# Patient Record
Sex: FEMALE | NOT HISPANIC OR LATINO | Employment: FULL TIME | URBAN - METROPOLITAN AREA
[De-identification: names, ages, dates, MRNs, and addresses within clinical notes are randomized per-mention and may not be internally consistent; named-entity substitution may affect disease eponyms.]

---

## 2024-04-30 ENCOUNTER — APPOINTMENT (OUTPATIENT)
Dept: PRIMARY CARE | Facility: CLINIC | Age: 34
End: 2024-04-30
Payer: COMMERCIAL

## 2024-05-28 PROBLEM — E78.6 LOW HDL (UNDER 40): Status: ACTIVE | Noted: 2020-01-31

## 2024-05-28 PROBLEM — E55.9 VITAMIN D DEFICIENCY: Status: ACTIVE | Noted: 2020-01-31

## 2024-05-28 PROBLEM — F41.9 ANXIETY AND DEPRESSION: Status: ACTIVE | Noted: 2024-04-18

## 2024-05-28 PROBLEM — F32.A ANXIETY AND DEPRESSION: Status: ACTIVE | Noted: 2024-04-18

## 2024-05-28 PROBLEM — R41.3 MEMORY LOSS: Status: ACTIVE | Noted: 2020-01-24

## 2024-05-28 PROBLEM — R73.01 IMPAIRED FASTING GLUCOSE: Status: ACTIVE | Noted: 2024-05-28

## 2024-05-28 RX ORDER — BUPROPION HYDROCHLORIDE 300 MG/1
1 TABLET ORAL EVERY MORNING
COMMUNITY
Start: 2023-07-27

## 2024-05-28 RX ORDER — DOCUSATE SODIUM 100 MG/1
100 CAPSULE, LIQUID FILLED ORAL 2 TIMES DAILY
COMMUNITY
Start: 2024-05-03 | End: 2024-06-03

## 2024-05-28 RX ORDER — FLUOXETINE HYDROCHLORIDE 20 MG/1
1 CAPSULE ORAL DAILY
COMMUNITY
Start: 2024-03-31

## 2024-05-28 RX ORDER — HYDROXYZINE HYDROCHLORIDE 10 MG/1
10 TABLET, FILM COATED ORAL DAILY PRN
COMMUNITY
Start: 2024-04-04

## 2024-05-29 ENCOUNTER — APPOINTMENT (OUTPATIENT)
Dept: PRIMARY CARE | Facility: CLINIC | Age: 34
End: 2024-05-29
Payer: COMMERCIAL

## 2024-06-06 ENCOUNTER — OFFICE VISIT (OUTPATIENT)
Dept: PRIMARY CARE | Facility: CLINIC | Age: 34
End: 2024-06-06
Payer: COMMERCIAL

## 2024-06-06 VITALS
HEART RATE: 76 BPM | OXYGEN SATURATION: 98 % | WEIGHT: 181 LBS | BODY MASS INDEX: 32.07 KG/M2 | DIASTOLIC BLOOD PRESSURE: 78 MMHG | SYSTOLIC BLOOD PRESSURE: 116 MMHG | HEIGHT: 63 IN

## 2024-06-06 DIAGNOSIS — E55.9 VITAMIN D DEFICIENCY: ICD-10-CM

## 2024-06-06 DIAGNOSIS — R73.01 IMPAIRED FASTING GLUCOSE: ICD-10-CM

## 2024-06-06 DIAGNOSIS — Z00.00 WELL ADULT EXAM: Primary | ICD-10-CM

## 2024-06-06 DIAGNOSIS — Z52.4 DONOR OF KIDNEY FOR TRANSPLANT: ICD-10-CM

## 2024-06-06 DIAGNOSIS — R53.83 OTHER FATIGUE: ICD-10-CM

## 2024-06-06 PROCEDURE — 1036F TOBACCO NON-USER: CPT | Performed by: NURSE PRACTITIONER

## 2024-06-06 PROCEDURE — 99395 PREV VISIT EST AGE 18-39: CPT | Performed by: NURSE PRACTITIONER

## 2024-06-06 RX ORDER — NALTREXONE HYDROCHLORIDE 50 MG/1
50 TABLET, FILM COATED ORAL DAILY
COMMUNITY
Start: 2024-05-10

## 2024-06-06 ASSESSMENT — PATIENT HEALTH QUESTIONNAIRE - PHQ9
SUM OF ALL RESPONSES TO PHQ9 QUESTIONS 1 AND 2: 0
1. LITTLE INTEREST OR PLEASURE IN DOING THINGS: NOT AT ALL
2. FEELING DOWN, DEPRESSED OR HOPELESS: NOT AT ALL

## 2024-06-06 ASSESSMENT — PAIN SCALES - GENERAL: PAINLEVEL: 0-NO PAIN
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
## 2024-06-19 ENCOUNTER — LAB (OUTPATIENT)
Dept: LAB | Facility: LAB | Age: 34
End: 2024-06-19
Payer: COMMERCIAL

## 2024-06-19 DIAGNOSIS — Z00.00 WELL ADULT EXAM: ICD-10-CM

## 2024-06-19 DIAGNOSIS — R53.83 OTHER FATIGUE: ICD-10-CM

## 2024-06-19 DIAGNOSIS — R73.01 IMPAIRED FASTING GLUCOSE: ICD-10-CM

## 2024-06-19 DIAGNOSIS — E55.9 VITAMIN D DEFICIENCY: ICD-10-CM

## 2024-06-19 LAB
25(OH)D3 SERPL-MCNC: 26 NG/ML (ref 31–100)
ALBUMIN SERPL-MCNC: 4.5 G/DL (ref 3.5–5)
ALP BLD-CCNC: 78 U/L (ref 35–125)
ALT SERPL-CCNC: 18 U/L (ref 5–40)
ANION GAP SERPL CALC-SCNC: 15 MMOL/L
APPEARANCE UR: CLEAR
AST SERPL-CCNC: 18 U/L (ref 5–40)
BASOPHILS # BLD AUTO: 0.02 X10*3/UL (ref 0–0.1)
BASOPHILS NFR BLD AUTO: 0.4 %
BILIRUB SERPL-MCNC: 0.4 MG/DL (ref 0.1–1.2)
BILIRUB UR STRIP.AUTO-MCNC: NEGATIVE MG/DL
BUN SERPL-MCNC: 15 MG/DL (ref 8–25)
CALCIUM SERPL-MCNC: 9.3 MG/DL (ref 8.5–10.4)
CHLORIDE SERPL-SCNC: 100 MMOL/L (ref 97–107)
CO2 SERPL-SCNC: 21 MMOL/L (ref 24–31)
COLOR UR: COLORLESS
CREAT SERPL-MCNC: 1.2 MG/DL (ref 0.4–1.6)
EGFRCR SERPLBLD CKD-EPI 2021: 61 ML/MIN/1.73M*2
EOSINOPHIL # BLD AUTO: 0.09 X10*3/UL (ref 0–0.7)
EOSINOPHIL NFR BLD AUTO: 1.7 %
ERYTHROCYTE [DISTWIDTH] IN BLOOD BY AUTOMATED COUNT: 11.9 % (ref 11.5–14.5)
EST. AVERAGE GLUCOSE BLD GHB EST-MCNC: 97 MG/DL
GLUCOSE SERPL-MCNC: 82 MG/DL (ref 65–99)
GLUCOSE UR STRIP.AUTO-MCNC: NORMAL MG/DL
HBA1C MFR BLD: 5 %
HCT VFR BLD AUTO: 43.1 % (ref 36–46)
HGB BLD-MCNC: 14.2 G/DL (ref 12–16)
IMM GRANULOCYTES # BLD AUTO: 0.03 X10*3/UL (ref 0–0.7)
IMM GRANULOCYTES NFR BLD AUTO: 0.6 % (ref 0–0.9)
KETONES UR STRIP.AUTO-MCNC: NEGATIVE MG/DL
LEUKOCYTE ESTERASE UR QL STRIP.AUTO: NEGATIVE
LYMPHOCYTES # BLD AUTO: 2.13 X10*3/UL (ref 1.2–4.8)
LYMPHOCYTES NFR BLD AUTO: 40.8 %
MCH RBC QN AUTO: 31.7 PG (ref 26–34)
MCHC RBC AUTO-ENTMCNC: 32.9 G/DL (ref 32–36)
MCV RBC AUTO: 96 FL (ref 80–100)
MONOCYTES # BLD AUTO: 0.34 X10*3/UL (ref 0.1–1)
MONOCYTES NFR BLD AUTO: 6.5 %
NEUTROPHILS # BLD AUTO: 2.61 X10*3/UL (ref 1.2–7.7)
NEUTROPHILS NFR BLD AUTO: 50 %
NITRITE UR QL STRIP.AUTO: NEGATIVE
NRBC BLD-RTO: 0 /100 WBCS (ref 0–0)
PH UR STRIP.AUTO: 6 [PH]
PLATELET # BLD AUTO: 321 X10*3/UL (ref 150–450)
POTASSIUM SERPL-SCNC: 4.7 MMOL/L (ref 3.4–5.1)
PROT SERPL-MCNC: 7.4 G/DL (ref 5.9–7.9)
PROT UR STRIP.AUTO-MCNC: NEGATIVE MG/DL
RBC # BLD AUTO: 4.48 X10*6/UL (ref 4–5.2)
RBC # UR STRIP.AUTO: NEGATIVE /UL
SODIUM SERPL-SCNC: 136 MMOL/L (ref 133–145)
SP GR UR STRIP.AUTO: 1.01
TSH SERPL DL<=0.05 MIU/L-ACNC: 1.77 MIU/L (ref 0.27–4.2)
UROBILINOGEN UR STRIP.AUTO-MCNC: NORMAL MG/DL
WBC # BLD AUTO: 5.2 X10*3/UL (ref 4.4–11.3)

## 2024-06-19 PROCEDURE — 85025 COMPLETE CBC W/AUTO DIFF WBC: CPT

## 2024-06-19 PROCEDURE — 80053 COMPREHEN METABOLIC PANEL: CPT

## 2024-06-19 PROCEDURE — 82306 VITAMIN D 25 HYDROXY: CPT

## 2024-06-19 PROCEDURE — 83036 HEMOGLOBIN GLYCOSYLATED A1C: CPT

## 2024-06-19 PROCEDURE — 84443 ASSAY THYROID STIM HORMONE: CPT

## 2024-06-19 PROCEDURE — 36415 COLL VENOUS BLD VENIPUNCTURE: CPT

## 2024-06-19 PROCEDURE — 81003 URINALYSIS AUTO W/O SCOPE: CPT

## 2024-08-21 ENCOUNTER — APPOINTMENT (OUTPATIENT)
Dept: PRIMARY CARE | Facility: CLINIC | Age: 34
End: 2024-08-21
Payer: COMMERCIAL

## 2024-08-21 VITALS
WEIGHT: 190 LBS | BODY MASS INDEX: 33.66 KG/M2 | DIASTOLIC BLOOD PRESSURE: 80 MMHG | OXYGEN SATURATION: 99 % | SYSTOLIC BLOOD PRESSURE: 110 MMHG | TEMPERATURE: 98.5 F | HEART RATE: 75 BPM

## 2024-08-21 DIAGNOSIS — E66.9 OBESITY (BMI 30-39.9): Primary | ICD-10-CM

## 2024-08-21 PROBLEM — F32.0 MAJOR DEPRESSIVE DISORDER, SINGLE EPISODE, MILD (CMS-HCC): Status: ACTIVE | Noted: 2020-10-22

## 2024-08-21 PROBLEM — E78.6 LIPOPROTEIN DEFICIENCY DISORDER: Status: ACTIVE | Noted: 2021-02-08

## 2024-08-21 PROBLEM — M51.26 HERNIATED LUMBAR INTERVERTEBRAL DISC: Status: ACTIVE | Noted: 2021-02-08

## 2024-08-21 PROBLEM — B37.2 CANDIDAL INTERTRIGO: Status: RESOLVED | Noted: 2024-08-21 | Resolved: 2024-08-21

## 2024-08-21 PROBLEM — B00.9 HERPES SIMPLEX VIRUS (HSV) INFECTION: Status: ACTIVE | Noted: 2020-10-22

## 2024-08-21 PROCEDURE — 99213 OFFICE O/P EST LOW 20 MIN: CPT | Performed by: NURSE PRACTITIONER

## 2024-08-21 RX ORDER — TIRZEPATIDE 2.5 MG/.5ML
2.5 INJECTION, SOLUTION SUBCUTANEOUS
Qty: 2 ML | Refills: 2 | Status: SHIPPED | OUTPATIENT
Start: 2024-08-25 | End: 2024-08-22 | Stop reason: WASHOUT

## 2024-08-21 ASSESSMENT — PATIENT HEALTH QUESTIONNAIRE - PHQ9
SUM OF ALL RESPONSES TO PHQ9 QUESTIONS 1 AND 2: 0
2. FEELING DOWN, DEPRESSED OR HOPELESS: NOT AT ALL
1. LITTLE INTEREST OR PLEASURE IN DOING THINGS: NOT AT ALL

## 2024-08-21 ASSESSMENT — PAIN SCALES - GENERAL: PAINLEVEL: 0-NO PAIN

## 2024-08-21 NOTE — PROGRESS NOTES
Subjective   Patient ID: María Elena Delgado is a 34 y.o. female who presents for Obesity.    HPI   Pt presents for weight concerns  She is interested in starting weight loss medications  Diet is varied - she has tried multiple diets without success. Presently tracking intake and trying to increase veggies/protein  Pt is active, playing [employer] ball 10-12 hours per week  Has tried Wellbutrin and Naltrexone thru online provider without success  Interested in GLP1 injectable    Review of Systems  Constitutional Symptoms: Negative for fever, loss of appetite, headaches, fatigue.   Cardiovascular: Negative for chest pain/pressure, palpitations, edema  Respiratory: Negative for shortness of breath, dyspnea on exertion, pain with breathing, coughing.   Gastrointestinal: Negative for nausea, vomiting, abdominal pain, change in bowel habits  Musculoskeletal: Negative for joint pain, joint swelling, myalgias, cramps.   Integumentary: Negative for skin trouble or rash.   Neurological: Negative for headache, numbness, tingling, weakness, tremors.   Psychiatric: Negative for depression, anxiety.   Endocrine: Negative for weight gain, heat or cold intolerance, polyuria, polydipsia, polyphagia.   Hematologic/Lymphatic: Negative for bruising, abnormal bleeding, swollen glands.     Objective   /80 (BP Location: Left arm, Patient Position: Sitting)   Pulse 75   Temp 36.9 °C (98.5 °F) (Temporal)   Wt 86.2 kg (190 lb)   SpO2 99%   BMI 33.66 kg/m²     Physical Exam  alert and oriented x3, NAD  Neck supple with no JVD  Lungs CTA bilaterally  Heart with RRR with no edema.  Abd obese, + normoactive bowel sounds, abd soft NT/ND  skin warm and dry  Neuro grossly intact     Assessment/Plan   Diagnoses and all orders for this visit:  Obesity (BMI 30-39.9)  Needs better control  Continue healthy diet - limit carbs, increase veggies, protein. Track using [employer]. 64oz water  Encouraged to check with her insurance company about GLP1  coverage.   Does not qualify for ozempic of mounjaro without DM diagnosis  [facility]  Follow up 1 month for weight check

## 2024-08-22 ENCOUNTER — TELEPHONE (OUTPATIENT)
Dept: PRIMARY CARE | Facility: CLINIC | Age: 34
End: 2024-08-22
Payer: COMMERCIAL

## 2024-08-22 DIAGNOSIS — E66.9 OBESITY (BMI 30-39.9): ICD-10-CM

## 2024-08-22 RX ORDER — TIRZEPATIDE 2.5 MG/.5ML
2.5 INJECTION, SOLUTION SUBCUTANEOUS
Qty: 2 ML | Refills: 2 | Status: CANCELLED | OUTPATIENT
Start: 2024-08-22

## 2024-08-22 NOTE — TELEPHONE ENCOUNTER
Pt needs the zepbound called in again , the pharmacy canc the order. [facility]

## 2024-08-27 NOTE — PROGRESS NOTES
Spoke with patient, states wayneound was sent as she requested, she needed no further assistance at this time.

## 2024-09-17 ENCOUNTER — APPOINTMENT (OUTPATIENT)
Dept: PRIMARY CARE | Facility: CLINIC | Age: 34
End: 2024-09-17
Payer: COMMERCIAL

## 2024-09-20 ENCOUNTER — APPOINTMENT (OUTPATIENT)
Dept: PRIMARY CARE | Facility: CLINIC | Age: 34
End: 2024-09-20
Payer: COMMERCIAL

## 2024-09-20 ENCOUNTER — CLINICAL SUPPORT (OUTPATIENT)
Dept: PRIMARY CARE | Facility: CLINIC | Age: 34
End: 2024-09-20
Payer: COMMERCIAL

## 2024-09-20 VITALS — WEIGHT: 179.2 LBS | BODY MASS INDEX: 31.74 KG/M2

## 2024-09-20 DIAGNOSIS — E66.9 OBESITY (BMI 30-39.9): ICD-10-CM

## 2024-09-20 DIAGNOSIS — E66.9 OBESITY WITH BODY MASS INDEX 30 OR GREATER: Primary | ICD-10-CM

## 2024-09-20 RX ORDER — TIRZEPATIDE 5 MG/.5ML
5 INJECTION, SOLUTION SUBCUTANEOUS
Qty: 2 ML | Refills: 0 | Status: SHIPPED | OUTPATIENT
Start: 2024-09-20 | End: 2024-10-18

## 2024-09-20 NOTE — PATIENT INSTRUCTIONS
Purchase either Miralax or Docusate Sodium 100mg capsules and take daily.    START Zepbound 5mg once weekly   Follow up with Angie one month

## 2024-09-20 NOTE — PROGRESS NOTES
Pharmacist Clinic: Weight Management    María Elena Delgado was referred to the Clinical Pharmacy Team for weight management.     Referring Provider: Elayne Acevedo, ELIAS-CNP  Problem List Items Addressed This Visit       Obesity with body mass index 30 or greater - Primary       HISTORY OF PRESENT ILLNESS    Starting Weight 190  Current Weight 179.2    Diet: diet changed diet, eating less sweets, stopped drinking SS soda, smaller portion size      Exercise Routine: plays pickleball 10-12 hours weekly     Additional Contributory Factors:        PHARMACY ASSESSMENT    Allergies: Patient has no known allergies.     [facility]pharm[facility] #[id number] - [place], [place] - [address] AT [facility] [address]  [address]  [facility] [address]  Phone: [phone] Fax: [phone]      No issues reported in regards to accessibility, adherence, adverse effects, or organization  Patient states she is paying out of pocket for Zepbound at $550 per month.   States that she may look into pricing at [employer] as medication is helping with sugar/sweet cravings.    CURRENT PHARMACOTHERAPY  - Zepbound 2.5mg      DRUG INTERACTIONS  - No significant drug-drug interactions exist that require adjustment to therapy    LAB  Lab Results   Component Value Date    BILITOT 0.4 06/19/2024    CALCIUM 9.3 06/19/2024    CO2 21 (L) 06/19/2024     06/19/2024    CREATININE 1.20 06/19/2024    GLUCOSE 82 06/19/2024    ALKPHOS 78 06/19/2024    K 4.7 06/19/2024    PROT 7.4 06/19/2024     06/19/2024    AST 18 06/19/2024    ALT 18 06/19/2024    BUN 15 06/19/2024    ANIONGAP 15 06/19/2024    ALBUMIN 4.5 06/19/2024    EGFR 61 06/19/2024     Lab Results   Component Value Date    TRIG 57 03/30/2023    CHOL 152 03/30/2023    LDLCALC 109 03/30/2023    HDL 32 (L) 03/30/2023     Lab Results   Component Value Date    HGBA1C 5.0 06/19/2024       Wt Readings from Last 6 Encounters:   08/21/24 86.2 kg (190 lb)   06/06/24 82.1 kg (181 lb)   07/27/23 85.3 kg (188  lb)   04/10/23 91.2 kg (201 lb)   03/31/23 107 kg (235 lb)   01/11/23 107 kg (235 lb)       Current Outpatient Medications on File Prior to Visit   Medication Sig Dispense Refill    buPROPion XL (Wellbutrin XL) 300 mg 24 hr tablet Take 1 tablet (300 mg) by mouth once daily in the morning.      FLUoxetine (PROzac) 20 mg capsule Take 1 capsule (20 mg) by mouth once daily.      hydrOXYzine HCL (Atarax) 10 mg tablet Take 1 tablet (10 mg) by mouth once daily as needed for anxiety.      naltrexone (Depade) 50 mg tablet Take 1 tablet (50 mg) by mouth once daily.      tirzepatide, weight loss, (Zepbound) 2.5 mg/0.5 mL injection Inject 2.5 mg under the skin every 7 days. 4 each 1     No current facility-administered medications on file prior to visit.       PATIENT EDUCATION/GOALS  - Counseled patient on MOA, expectations, side effects, duration of therapy, contraindications, administration, and monitoring parameters  - Answered all patient questions and concerns; provided [facility] phone number if issues/questions arise    RECOMMENDATIONS/PLAN  1. Increase Zepbound dose to 5mg weekly   2.  Discussed monthly visits for 3 months then quarterly to assess SE/weight loss    Clinical Pharmacist follow up: 1 month  Type of Encounter: in person     Continue all meds under the continuation of care with the referring provider and clinical pharmacy team.    Thank you,  Angie Denise, [facility]     Verbal consent to manage patient's drug therapy was obtained from patient. They were informed they may decline to participate or withdraw from participation in pharmacy services at any time.

## 2024-10-23 ENCOUNTER — APPOINTMENT (OUTPATIENT)
Dept: PRIMARY CARE | Facility: CLINIC | Age: 34
End: 2024-10-23
Payer: COMMERCIAL

## 2024-10-23 VITALS — BODY MASS INDEX: 29.97 KG/M2 | WEIGHT: 169.2 LBS

## 2024-10-23 DIAGNOSIS — E66.9 OBESITY WITH BODY MASS INDEX 30 OR GREATER: Primary | ICD-10-CM

## 2024-10-23 NOTE — PROGRESS NOTES
Pharmacist Clinic: Weight Management    María Elena Delgado was referred to the Clinical Pharmacy Team for weight management.     Referring Provider: ELIAS Cummins-CNP  Problem List Items Addressed This Visit       Obesity with body mass index 30 or greater - Primary       HISTORY OF PRESENT ILLNESS    Starting Weight 190  Current Weight 169.2     Diet: diet changed diet, eating less sweets, stopped drinking SS soda, smaller portion size     Exercise Routine: plays pickleball 10-12 hours weekly      Additional Contributory Factors:  none       PHARMACY ASSESSMENT    Allergies: Patient has no known allergies.     [id number] - [place], [place] - [address] AT [place] [address]  [address]  [facility] [address]  Phone: [phone] Fax: [phone]    [id number] - [place], [place] - [address] [facility] [address]  [facility] [facility] [address]  Phone: [phone] Fax: [phone]      No issues reported in regards to accessibility, affordability, adherence, adverse effects, or organization    CURRENT PHARMACOTHERAPY  - Zepbound 5mg weekly      DRUG INTERACTIONS  - No significant drug-drug interactions exist that require adjustment to therapy    LAB  Lab Results   Component Value Date    BILITOT 0.4 06/19/2024    CALCIUM 9.3 06/19/2024    CO2 21 (L) 06/19/2024     06/19/2024    CREATININE 1.20 06/19/2024    GLUCOSE 82 06/19/2024    ALKPHOS 78 06/19/2024    K 4.7 06/19/2024    PROT 7.4 06/19/2024     06/19/2024    AST 18 06/19/2024    ALT 18 06/19/2024    BUN 15 06/19/2024    ANIONGAP 15 06/19/2024    ALBUMIN 4.5 06/19/2024    EGFR 61 06/19/2024     Lab Results   Component Value Date    TRIG 57 03/30/2023    CHOL 152 03/30/2023    LDLCALC 109 03/30/2023    HDL 32 (L) 03/30/2023     Lab Results   Component Value Date    HGBA1C 5.0 06/19/2024       Wt Readings from Last 6 Encounters:   09/20/24 81.3 kg (179 lb 3.2 oz)   08/21/24 86.2 kg (190 lb)    24 82.1 kg (181 lb)   23 85.3 kg (188 lb)   04/10/23 91.2 kg (201 lb)   23 107 kg (235 lb)       Current Outpatient Medications on File Prior to Visit   Medication Sig Dispense Refill    buPROPion XL (Wellbutrin XL) 300 mg 24 hr tablet Take 1 tablet (300 mg) by mouth once daily in the morning.      FLUoxetine (PROzac) 20 mg capsule Take 1 capsule (20 mg) by mouth once daily.      hydrOXYzine HCL (Atarax) 10 mg tablet Take 1 tablet (10 mg) by mouth once daily as needed for anxiety.      naltrexone (Depade) 50 mg tablet Take 1 tablet (50 mg) by mouth once daily.      [] tirzepatide, weight loss, (Zepbound) 5 mg/0.5 mL injection Inject 5 mg under the skin every 7 days for 28 days. 2 mL 0     No current facility-administered medications on file prior to visit.       PATIENT EDUCATION/GOALS  - Counseled patient on MOA, expectations, side effects, duration of therapy, contraindications, administration, and monitoring parameters  - Answered all patient questions and concerns; provided [facility] phone number if issues/questions arise    RECOMMENDATIONS/PLAN  1. Suggest Continue Zepbound 5mg once weekly   2.  Discussed medication via other sources due to cost.  Pt obtained med from [facility]     Clinical Pharmacist follow up: 2 months  Type of Encounter: in person     Continue all meds under the continuation of care with the referring provider and clinical pharmacy team.    Thank you,  Angie Denise, [facility]     Verbal consent to manage patient's drug therapy was obtained from patient. They were informed they may decline to participate or withdraw from participation in pharmacy services at any time.

## 2024-12-31 ENCOUNTER — APPOINTMENT (OUTPATIENT)
Dept: PRIMARY CARE | Facility: CLINIC | Age: 34
End: 2024-12-31
Payer: COMMERCIAL

## 2024-12-31 VITALS — WEIGHT: 153 LBS | BODY MASS INDEX: 27.1 KG/M2

## 2024-12-31 DIAGNOSIS — E66.9 OBESITY WITH BODY MASS INDEX 30 OR GREATER: ICD-10-CM

## 2024-12-31 NOTE — PROGRESS NOTES
Pharmacist Clinic: Weight Management  María Elena Delgado was referred to the Clinical Pharmacy Team for weight management.   Referring Provider: Elayne Acevedo, APRN-CNP    Problem List Items Addressed This Visit       Obesity with body mass index 30 or greater       History of Present Illness  Starting Weight/ lbs  Current Weight 153 lbs  Wt Readings from Last 1 Encounters:   10/23/24 76.7 kg (169 lb 3.2 oz)       Diet: improving, trying to eat smaller portions    Exercise Routine: 30 minutes 3-4 days per week    Additional Contributory Factors: She has a past medical history of Personal history of other diseases of the respiratory system (12/14/2014).    CURRENT PHARMACOTHERAPY  Compounded tirzepatide 7.5mg     Patient states that her insurance allows for PA after 1/1/25.  Will submit PA at that time which will save money on compounded product.      No issues reported in regards to accessibility, affordability, adherence, adverse effects, or organization.      DRUG INTERACTIONS  - No significant drug-drug interactions exist that require adjustment to therapy    LAB  Lab Results   Component Value Date    BILITOT 0.4 06/19/2024    CALCIUM 9.3 06/19/2024    CO2 21 (L) 06/19/2024     06/19/2024    CREATININE 1.20 06/19/2024    GLUCOSE 82 06/19/2024    ALKPHOS 78 06/19/2024    K 4.7 06/19/2024    PROT 7.4 06/19/2024     06/19/2024    AST 18 06/19/2024    ALT 18 06/19/2024    BUN 15 06/19/2024    ANIONGAP 15 06/19/2024    ALBUMIN 4.5 06/19/2024    EGFR 61 06/19/2024     Lab Results   Component Value Date    TRIG 57 03/30/2023    CHOL 152 03/30/2023    LDLCALC 109 03/30/2023    HDL 32 (L) 03/30/2023     Lab Results   Component Value Date    HGBA1C 5.0 06/19/2024       Current Outpatient Medications on File Prior to Visit   Medication Sig Dispense Refill    buPROPion XL (Wellbutrin XL) 300 mg 24 hr tablet Take 1 tablet (300 mg) by mouth once daily in the morning.      FLUoxetine (PROzac) 20 mg capsule  Take 1 capsule (20 mg) by mouth once daily.      hydrOXYzine HCL (Atarax) 10 mg tablet Take 1 tablet (10 mg) by mouth once daily as needed for anxiety.      naltrexone (Depade) 50 mg tablet Take 1 tablet (50 mg) by mouth once daily.       No current facility-administered medications on file prior to visit.       PATIENT EDUCATION/GOALS  - Counseled patient on MOA, expectations, side effects, duration of therapy, contraindications, administration, and monitoring parameters of current medication   - Answered all patient questions and concerns; provided [facility] phone number if issues/questions arise    RECOMMENDATIONS/PLAN  1. Suggest increase Zepbound to 7.5mg weekly   2.  Discussed monthly visits for 3 months then quarterly to assess SE/weight loss  3.  Clinical Pharmacist follow up: 3 months    Continue all meds under the continuation of care with the referring provider and clinical pharmacy team.  Send 10mg rx for 3 months if PA approved     Thank youALEKS, [facility], [facility]      Verbal consent to manage patient's drug therapy was obtained from patient. They were informed they may decline to participate or withdraw from participation in pharmacy services at any time.

## 2025-01-02 DIAGNOSIS — E66.9 OBESITY WITH BODY MASS INDEX 30 OR GREATER: Primary | ICD-10-CM

## 2025-01-14 ENCOUNTER — OFFICE VISIT (OUTPATIENT)
Dept: URGENT CARE | Age: 35
End: 2025-01-14
Payer: COMMERCIAL

## 2025-01-14 VITALS
HEART RATE: 95 BPM | SYSTOLIC BLOOD PRESSURE: 108 MMHG | TEMPERATURE: 98.8 F | DIASTOLIC BLOOD PRESSURE: 71 MMHG | OXYGEN SATURATION: 99 % | RESPIRATION RATE: 18 BRPM

## 2025-01-14 DIAGNOSIS — N30.01 ACUTE CYSTITIS WITH HEMATURIA: Primary | ICD-10-CM

## 2025-01-14 DIAGNOSIS — R30.0 DYSURIA: ICD-10-CM

## 2025-01-14 LAB
POC APPEARANCE, URINE: ABNORMAL
POC BILIRUBIN, URINE: ABNORMAL
POC BLOOD, URINE: ABNORMAL
POC COLOR, URINE: YELLOW
POC GLUCOSE, URINE: NEGATIVE MG/DL
POC KETONES, URINE: ABNORMAL MG/DL
POC LEUKOCYTES, URINE: ABNORMAL
POC NITRITE,URINE: NEGATIVE
POC PH, URINE: 6 PH
POC PROTEIN, URINE: ABNORMAL MG/DL
POC SPECIFIC GRAVITY, URINE: 1.02
POC UROBILINOGEN, URINE: 0.2 EU/DL
PREGNANCY TEST URINE, POC: NEGATIVE

## 2025-01-14 PROCEDURE — 87186 SC STD MICRODIL/AGAR DIL: CPT

## 2025-01-14 PROCEDURE — 81003 URINALYSIS AUTO W/O SCOPE: CPT | Performed by: NURSE PRACTITIONER

## 2025-01-14 PROCEDURE — 99204 OFFICE O/P NEW MOD 45 MIN: CPT | Performed by: NURSE PRACTITIONER

## 2025-01-14 PROCEDURE — 81025 URINE PREGNANCY TEST: CPT | Performed by: NURSE PRACTITIONER

## 2025-01-14 PROCEDURE — 87086 URINE CULTURE/COLONY COUNT: CPT

## 2025-01-14 RX ORDER — SULFAMETHOXAZOLE AND TRIMETHOPRIM 800; 160 MG/1; MG/1
1 TABLET ORAL 2 TIMES DAILY
Qty: 14 TABLET | Refills: 0 | Status: SHIPPED | OUTPATIENT
Start: 2025-01-14

## 2025-01-14 RX ORDER — PHENAZOPYRIDINE HYDROCHLORIDE 200 MG/1
200 TABLET, FILM COATED ORAL 3 TIMES DAILY PRN
Qty: 10 TABLET | Refills: 0 | Status: SHIPPED | OUTPATIENT
Start: 2025-01-14 | End: 2025-01-17

## 2025-01-14 ASSESSMENT — ENCOUNTER SYMPTOMS
RHINORRHEA: 0
NECK PAIN: 0
FATIGUE: 0
FLANK PAIN: 0
HEMATURIA: 0
EYE PAIN: 0
CHILLS: 0
TROUBLE SWALLOWING: 0
DIZZINESS: 0
FREQUENCY: 1
COUGH: 0
BACK PAIN: 0
DIARRHEA: 0
PALPITATIONS: 0
DYSURIA: 1
VOMITING: 0
DIFFICULTY URINATING: 0
SORE THROAT: 0
MYALGIAS: 0
NAUSEA: 0
HEADACHES: 0
APPETITE CHANGE: 0
WOUND: 0
SINUS PAIN: 0
SINUS PRESSURE: 0
ABDOMINAL PAIN: 0
FEVER: 0
ARTHRALGIAS: 0
CONSTIPATION: 0
CHEST TIGHTNESS: 0
SHORTNESS OF BREATH: 0

## 2025-01-14 ASSESSMENT — VISUAL ACUITY: OU: 1

## 2025-01-14 NOTE — LETTER
January 14, 2025     Patient: María Elena Delgado   YOB: 1990   Date of Visit: 1/14/2025       To Whom It May Concern:    María Elena Delgado was seen in my clinic on 1/14/2025 at 8:00 pm. Please excuse María Elena for her absence from work on this day to make the appointment.    If you have any questions or concerns, please don't hesitate to call.         Sincerely,         Lolly Pierre, APRN-CNP        CC: No Recipients

## 2025-01-15 NOTE — PROGRESS NOTES
Subjective   Patient ID: María Elena Delgado is a 34 y.o. female. They present today with a chief complaint of UTI (Uncomfortable to urinate, x 2 days. ).    History of Present Illness  Patient is a 35yo female who present with UTI symptoms - urgency, frequency and pain x 2 days. The patient has one kidney. She donated one of her kidneys 8 months ago. This is her first UTI since the donation. Her last UTI was when she was pregnant. She is under the care of a nephrologist for the next two years and does not require any renal dosing of medications.       UTI  Associated symptoms: no abdominal pain, no chest pain, no congestion, no cough, no diarrhea, no ear pain, no fatigue, no fever, no headaches, no myalgias, no nausea, no rash, no rhinorrhea, no shortness of breath, no sore throat and no vomiting        Past Medical History  Allergies as of 01/14/2025    (No Known Allergies)       (Not in a hospital admission)       Past Medical History:   Diagnosis Date    Personal history of other diseases of the respiratory system 12/14/2014    History of sore throat       Past Surgical History:   Procedure Laterality Date    OTHER SURGICAL HISTORY  03/21/2019    Foot surgery        reports that she has never smoked. She has never used smokeless tobacco. She reports current alcohol use. She reports that she does not use drugs.    Review of Systems  Review of Systems   Constitutional:  Negative for appetite change, chills, fatigue and fever.   HENT:  Negative for congestion, dental problem, ear pain, hearing loss, postnasal drip, rhinorrhea, sinus pressure, sinus pain, sneezing, sore throat and trouble swallowing.    Eyes:  Negative for pain.   Respiratory:  Negative for cough, chest tightness and shortness of breath.    Cardiovascular:  Negative for chest pain and palpitations.   Gastrointestinal:  Negative for abdominal pain, constipation, diarrhea, nausea and vomiting.   Genitourinary:  Positive for dysuria, frequency and  urgency. Negative for decreased urine volume, difficulty urinating, enuresis, flank pain, genital sores, hematuria, menstrual problem, pelvic pain, vaginal bleeding, vaginal discharge and vaginal pain.   Musculoskeletal:  Negative for arthralgias, back pain, gait problem, myalgias and neck pain.   Skin:  Negative for rash and wound.   Neurological:  Negative for dizziness and headaches.   Psychiatric/Behavioral:  Negative for self-injury and suicidal ideas.                                   Objective    Vitals:    01/14/25 1958   BP: 108/71   BP Location: Left arm   Pulse: 95   Resp: 18   Temp: 37.1 °C (98.8 °F)   TempSrc: Oral   SpO2: 99%     No LMP recorded.    Physical Exam  Vitals reviewed.   Constitutional:       General: She is awake. She is not in acute distress.     Appearance: Normal appearance. She is well-developed, well-groomed and normal weight. She is not ill-appearing.   HENT:      Head: Normocephalic and atraumatic.      Right Ear: Hearing and external ear normal.      Left Ear: Hearing and external ear normal.      Nose: Nose normal. No nasal deformity or signs of injury.      Mouth/Throat:      Lips: Pink.   Eyes:      General: Lids are normal. Vision grossly intact.   Cardiovascular:      Rate and Rhythm: Normal rate and regular rhythm.      Heart sounds: Normal heart sounds, S1 normal and S2 normal. Heart sounds not distant. No murmur heard.  Pulmonary:      Effort: Pulmonary effort is normal. No tachypnea, bradypnea, accessory muscle usage, prolonged expiration, respiratory distress or retractions.      Breath sounds: Normal breath sounds and air entry. No stridor, decreased air movement or transmitted upper airway sounds. No decreased breath sounds.   Chest:      Chest wall: No deformity.   Abdominal:      General: Abdomen is flat. Bowel sounds are normal.      Palpations: Abdomen is soft.      Tenderness: There is no right CVA tenderness or left CVA tenderness.   Skin:     General: Skin is  warm and dry.      Capillary Refill: Capillary refill takes less than 2 seconds.   Neurological:      General: No focal deficit present.      Mental Status: She is alert.      Gait: Gait is intact.   Psychiatric:         Attention and Perception: Attention and perception normal.         Mood and Affect: Mood and affect normal.         Speech: Speech normal.         Behavior: Behavior normal. Behavior is cooperative.         Procedures    Point of Care Test & Imaging Results from this visit  Results for orders placed or performed in visit on 01/14/25   POCT pregnancy, urine manually resulted   Result Value Ref Range    Preg Test, Ur Negative Negative   POCT UA Automated manually resulted   Result Value Ref Range    POC Color, Urine Yellow Straw, Yellow, Light-Yellow    POC Appearance, Urine Cloudy (A) Clear    POC Glucose, Urine NEGATIVE NEGATIVE mg/dl    POC Bilirubin, Urine SMALL (1+) (A) NEGATIVE    POC Ketones, Urine 15 (1+) (A) NEGATIVE mg/dl    POC Specific Gravity, Urine 1.025 1.005 - 1.035    POC Blood, Urine LARGE (3+) (A) NEGATIVE    POC PH, Urine 6.0 No Reference Range Established PH    POC Protein, Urine 100 (2+) (A) NEGATIVE mg/dl    POC Urobilinogen, Urine 0.2 0.2, 1.0 EU/DL    Poc Nitrite, Urine NEGATIVE NEGATIVE    POC Leukocytes, Urine LARGE (3+) (A) NEGATIVE      No results found.    Diagnostic study results (if any) were reviewed by JOSELO Schwartz.    Assessment/Plan   Allergies, medications, history, and pertinent labs/EKGs/Imaging reviewed by JOSELO Schwartz.     Medical Decision Making  Urinalysis indicative of a urinary tract infection. Will treat with abx and pyridium for symptoms.  Will also send urine for culture. Advised patient to increase fluid intake and to notify her nephrologist of UTI and urinalysis results that include protein, ketones, and bilirubin.     Risks, benefits, and alternatives of the medications and treatment plan prescribed today were  discussed, and patient expressed understanding. Plan follow up as discussed or as needed if any worsening symptoms or change in condition. Reinforced red flags including (but not limited to): severe or worsening abdominal pain; difficulty swallowing; stiff neck; shortness of breath; coughing or vomiting blood; chest pain; and new or increased fever are indications to go to the Emergency Department.    The patient voices understanding of all medications. No barriers to adherence. Patient is taking all medications as prescribed and tolerating well. For any new medications, the patient was instructed of directions for and consequences of not taking medication and they were informed about the potential side effects and drug interactions. The after-visit summary was given to the patient and care instructions were reviewed with the patient. All questions were answered and the patient verbalized understanding of the plan of care for today.    Orders and Diagnoses  Diagnoses and all orders for this visit:  Acute cystitis with hematuria  -     sulfamethoxazole-trimethoprim (Bactrim DS) 800-160 mg tablet; Take 1 tablet by mouth 2 times a day.  Dysuria  -     POCT pregnancy, urine manually resulted  -     POCT UA Automated manually resulted  -     Urine Culture  -     phenazopyridine (Pyridium) 200 mg tablet; Take 1 tablet (200 mg) by mouth 3 times a day as needed for bladder spasms for up to 3 days.      Medical Admin Record      Patient disposition: Home    Electronically signed by JOSELO Schwartz  8:28 PM

## 2025-01-17 LAB — BACTERIA UR CULT: ABNORMAL

## 2025-01-22 ENCOUNTER — TELEPHONE (OUTPATIENT)
Dept: URGENT CARE | Age: 35
End: 2025-01-22

## 2025-01-22 NOTE — TELEPHONE ENCOUNTER
Called pt at telephone number provided. Unable to leave VM. Called pt to discuss ABT and urine culture.

## 2025-01-27 DIAGNOSIS — E66.9 OBESITY WITH BODY MASS INDEX 30 OR GREATER: Primary | ICD-10-CM

## 2025-01-27 RX ORDER — TIRZEPATIDE 10 MG/.5ML
10 INJECTION, SOLUTION SUBCUTANEOUS
Qty: 2 ML | Refills: 0 | Status: SHIPPED | OUTPATIENT
Start: 2025-01-27

## 2025-01-27 NOTE — PROGRESS NOTES
Pt insurance requires maintenance dose of 5,10,15.  Sent one month supply of 10mg for patient.  Must make appt with new PCP for additional fills.

## 2025-02-13 ENCOUNTER — APPOINTMENT (OUTPATIENT)
Dept: PRIMARY CARE | Facility: CLINIC | Age: 35
End: 2025-02-13
Payer: COMMERCIAL

## 2025-02-13 VITALS
TEMPERATURE: 97.4 F | DIASTOLIC BLOOD PRESSURE: 70 MMHG | WEIGHT: 147 LBS | HEIGHT: 63 IN | HEART RATE: 80 BPM | OXYGEN SATURATION: 99 % | SYSTOLIC BLOOD PRESSURE: 112 MMHG | BODY MASS INDEX: 26.05 KG/M2

## 2025-02-13 DIAGNOSIS — E66.9 OBESITY WITH BODY MASS INDEX 30 OR GREATER: Primary | ICD-10-CM

## 2025-02-13 DIAGNOSIS — R39.9 UTI SYMPTOMS: ICD-10-CM

## 2025-02-13 DIAGNOSIS — N30.90 CYSTITIS: ICD-10-CM

## 2025-02-13 LAB
POC APPEARANCE, URINE: CLEAR
POC BILIRUBIN, URINE: NEGATIVE
POC BLOOD, URINE: NEGATIVE
POC COLOR, URINE: ABNORMAL
POC GLUCOSE, URINE: NEGATIVE MG/DL
POC KETONES, URINE: NEGATIVE MG/DL
POC LEUKOCYTES, URINE: ABNORMAL
POC NITRITE,URINE: NEGATIVE
POC PH, URINE: 5.5 PH
POC PROTEIN, URINE: NEGATIVE MG/DL
POC SPECIFIC GRAVITY, URINE: 1.01
POC UROBILINOGEN, URINE: 0.2 EU/DL

## 2025-02-13 PROCEDURE — 81002 URINALYSIS NONAUTO W/O SCOPE: CPT

## 2025-02-13 PROCEDURE — 99213 OFFICE O/P EST LOW 20 MIN: CPT

## 2025-02-13 PROCEDURE — 3008F BODY MASS INDEX DOCD: CPT

## 2025-02-13 PROCEDURE — 1036F TOBACCO NON-USER: CPT

## 2025-02-13 RX ORDER — TIRZEPATIDE 10 MG/.5ML
10 INJECTION, SOLUTION SUBCUTANEOUS
Qty: 2 ML | Refills: 2 | Status: SHIPPED | OUTPATIENT
Start: 2025-02-13

## 2025-02-13 RX ORDER — SULFAMETHOXAZOLE AND TRIMETHOPRIM 800; 160 MG/1; MG/1
1 TABLET ORAL 2 TIMES DAILY
Qty: 10 TABLET | Refills: 0 | Status: SHIPPED | OUTPATIENT
Start: 2025-02-13 | End: 2025-02-18

## 2025-02-13 ASSESSMENT — PATIENT HEALTH QUESTIONNAIRE - PHQ9
1. LITTLE INTEREST OR PLEASURE IN DOING THINGS: NOT AT ALL
2. FEELING DOWN, DEPRESSED OR HOPELESS: NOT AT ALL
SUM OF ALL RESPONSES TO PHQ9 QUESTIONS 1 AND 2: 0

## 2025-02-13 ASSESSMENT — COLUMBIA-SUICIDE SEVERITY RATING SCALE - C-SSRS
1. IN THE PAST MONTH, HAVE YOU WISHED YOU WERE DEAD OR WISHED YOU COULD GO TO SLEEP AND NOT WAKE UP?: NO
2. HAVE YOU ACTUALLY HAD ANY THOUGHTS OF KILLING YOURSELF?: NO
6. HAVE YOU EVER DONE ANYTHING, STARTED TO DO ANYTHING, OR PREPARED TO DO ANYTHING TO END YOUR LIFE?: NO

## 2025-02-13 NOTE — ASSESSMENT & PLAN NOTE
Chronic condition  Weight loss as expected  Medication: About 10 mg subcutaneously weekly  Explained intended effects, potential side effects, and schedule of dosages of the medication.  Discussed healthy diet and exercise at length to maintain weight loss  Follow up in 3 month    Orders:    tirzepatide, weight loss, (Zepbound) 10 mg/0.5 mL injection; Inject 10 mg under the skin every 7 days.

## 2025-02-13 NOTE — PROGRESS NOTES
"Subjective     Patient ID: María Elena Delgado is a 34 y.o. female who presents for Medication Visit (Requested a UA , due to frequency ).      HPI  Jo presents today for follow-up of weight management.  She has been taking Zepbound, current dose is 10 mg subcutaneous weekly.Denies any side effects on increased dose.  Starting weight of 190.  Current weight: 147    She had lost about 60 lbs on her own prior to beginning medication. Highest weight was 250.     Diet interventions: She have been working on her portion control, mindful of healthier eating  Exercise interventions: Plays Babyage ball 3-4 days per week, weight training      She also has concerns today for persistent feeling of bladder fullness.  She had a urinary tract infection last month and was concerned that maybe it had not completely cleared.  She did complete a course of Bactrim DS.  Denies any pain with urination, any urinary frequency, or urgency.  No hematuria.  No flank pain.  No fevers no chills.    Patient's recent visit notes, medication and allergy lists, past medical surgical social hx, immunization, vitals, problem list, recent tests were reviewed by me for pertinence to this visit.        Review of Systems  All other systems have been reviewed and are negative except as noted in the HPI.         Objective   /70 (BP Location: Left arm, Patient Position: Sitting, BP Cuff Size: Adult)   Pulse 80   Temp 36.3 °C (97.4 °F) (Oral)   Ht 1.6 m (5' 3\")   Wt 66.7 kg (147 lb)   SpO2 99%   BMI 26.04 kg/m²       Physical Exam  Nursing notes and vitals reviewed  General appearance - AAOx3, non distressed  CVS - Warm and well perfused  RESP - No respiratory distress  PSYCH - Normal thought content, fluent and appropriate speech        Assessment & Plan  Obesity with body mass index 30 or greater  Chronic condition  Weight loss as expected  Medication: About 10 mg subcutaneously weekly  Explained intended effects, potential side effects, and " schedule of dosages of the medication.  Discussed healthy diet and exercise at length to maintain weight loss  Follow up in 3 month    Orders:    tirzepatide, weight loss, (Zepbound) 10 mg/0.5 mL injection; Inject 10 mg under the skin every 7 days.    Cystitis  Acute.  Urine dipstick: +leukocytes  Start Bactrim DS- please complete full course unless you hear otherwise from our office.  Discussed indication for antibiotic use, administration instructions, and adverse effects with patient.  Increase your fluid intake, Tylenol or Motrin as needed for pain or fever.  May use OTC AZO/UROSTAT/Cystex for symptoms relief if desired.   Discussed hygiene for prevention.  Call our office to report and new fever/chills or back pain.  Follow up in 1 week if symptoms persist.     Orders:    sulfamethoxazole-trimethoprim (Bactrim DS) 800-160 mg tablet; Take 1 tablet by mouth 2 times a day for 5 days.    UTI symptoms    Orders:    POCT UA (nonautomated) manually resulted              Jeanne Zafar, APRN-CNP

## 2025-03-31 ENCOUNTER — APPOINTMENT (OUTPATIENT)
Dept: PRIMARY CARE | Facility: CLINIC | Age: 35
End: 2025-03-31
Payer: COMMERCIAL

## 2025-03-31 VITALS — HEIGHT: 63 IN | BODY MASS INDEX: 25.87 KG/M2 | WEIGHT: 146 LBS

## 2025-03-31 DIAGNOSIS — E66.9 OBESITY WITH BODY MASS INDEX 30 OR GREATER: ICD-10-CM

## 2025-03-31 RX ORDER — TIRZEPATIDE 12.5 MG/.5ML
12.5 INJECTION, SOLUTION SUBCUTANEOUS
Qty: 2 ML | Refills: 0 | Status: SHIPPED | OUTPATIENT
Start: 2025-03-31

## 2025-03-31 NOTE — PROGRESS NOTES
Pharmacist Clinic: Weight Management  María Elena Delgado was referred to the Clinical Pharmacy Team for weight management.   Referring Provider: JOSELO Nuñez    Pt here today for follow up re: wt loss.  Patient has been using weekly GLP-1/GIP medication for the last 8 months.  Pt is following a healthy diet and plays pickleball 3-4 times per week.  She continues to work towards a healthy weight.  Total wt loss is >40 lbs.  Pt reports that she has an upcoming appt with fertility at [facility] to discuss IVF.      Problem List Items Addressed This Visit       Obesity with body mass index 30 or greater       History of Present Illness  Starting Weight  Wt Readings from Last 6 Encounters:   02/13/25 66.7 kg (147 lb)   12/31/24 69.4 kg (153 lb)   10/23/24 76.7 kg (169 lb 3.2 oz)   09/20/24 81.3 kg (179 lb 3.2 oz)   08/21/24 86.2 kg (190 lb)   06/06/24 82.1 kg (181 lb)     BMI Readings from Last 6 Encounters:   02/13/25 26.04 kg/m²   12/31/24 27.10 kg/m²   10/23/24 29.97 kg/m²   09/20/24 31.74 kg/m²   08/21/24 33.66 kg/m²   06/06/24 32.06 kg/m²         Diet: in general pt follows high protein, low carb diet    Exercise Routine: 30 minutes 3-4 days per week    Additional Contributory Factors: She has a past medical history of Personal history of other diseases of the respiratory system (12/14/2014).    CURRENT PHARMACOTHERAPY  Zepbound 10mg weekly     No issues reported in regards to accessibility, affordability, adherence, adverse effects, or organization.      DRUG INTERACTIONS  - No significant drug-drug interactions exist that require adjustment to therapy    LAB  Lab Results   Component Value Date    BILITOT 0.4 06/19/2024    CALCIUM 9.3 06/19/2024    CO2 21 (L) 06/19/2024     06/19/2024    CREATININE 1.20 06/19/2024    GLUCOSE 82 06/19/2024    ALKPHOS 78 06/19/2024    K 4.7 06/19/2024    PROT 7.4 06/19/2024     06/19/2024    AST 18 06/19/2024    ALT 18 06/19/2024    BUN 15 06/19/2024    ANIONGAP 15  06/19/2024    ALBUMIN 4.5 06/19/2024    EGFR 61 06/19/2024     Lab Results   Component Value Date    TRIG 57 03/30/2023    CHOL 152 03/30/2023    LDLCALC 109 03/30/2023    HDL 32 (L) 03/30/2023     Lab Results   Component Value Date    HGBA1C 5.0 06/19/2024       Current Outpatient Medications on File Prior to Visit   Medication Sig Dispense Refill    buPROPion XL (Wellbutrin XL) 300 mg 24 hr tablet Take 1 tablet (300 mg) by mouth once daily in the morning.      FLUoxetine (PROzac) 20 mg capsule Take 1 capsule (20 mg) by mouth once daily.      hydrOXYzine HCL (Atarax) 10 mg tablet Take 1 tablet (10 mg) by mouth once daily as needed for anxiety.      tirzepatide, weight loss, (Zepbound) 10 mg/0.5 mL injection Inject 10 mg under the skin every 7 days. 2 mL 2     No current facility-administered medications on file prior to visit.       PATIENT EDUCATION/GOALS  - Counseled patient on MOA, expectations, side effects, duration of therapy, contraindications, administration, and monitoring parameters of current medication   - Answered all patient questions and concerns; provided [facility] phone number if issues/questions arise    RECOMMENDATIONS/PLAN  1. Suggest increase to Zepbound 12.5mg weekly   2.  Discussed insurance restrictions.  Patient can only use 12.5mg for one month then must increase.  Pt will message me when finished with one month of 12.5 and we will discuss sending 15mg dose to pharmacy.  Discussed continuation of medication at 15mg maintenance dose.    3.  Pt has questions regarding medication and fertility treatments.  Discussed need to stop medication if she becomes pregnant and that she may need to discontinue with fertility treatments.  She will check with [facility] at her appt.    4.  Clinical Pharmacist follow up: 3 months     Continue all meds under the continuation of care with the referring provider and clinical pharmacy team.    Thank you,  ALEKS Denise, [facility], [facility]      Verbal  consent to manage patient's drug therapy was obtained from patient. They were informed they may decline to participate or withdraw from participation in pharmacy services at any time.

## 2025-05-09 DIAGNOSIS — E66.9 OBESITY WITH BODY MASS INDEX 30 OR GREATER: Primary | ICD-10-CM

## 2025-05-09 RX ORDER — TIRZEPATIDE 15 MG/.5ML
15 INJECTION, SOLUTION SUBCUTANEOUS
Qty: 2 ML | Refills: 2 | Status: SHIPPED | OUTPATIENT
Start: 2025-05-09

## 2025-05-14 ENCOUNTER — CONSULT (OUTPATIENT)
Dept: ENDOCRINOLOGY | Facility: CLINIC | Age: 35
End: 2025-05-14
Payer: COMMERCIAL

## 2025-06-10 ENCOUNTER — APPOINTMENT (OUTPATIENT)
Dept: PRIMARY CARE | Facility: CLINIC | Age: 35
End: 2025-06-10
Payer: COMMERCIAL

## 2025-06-10 VITALS
BODY MASS INDEX: 22.68 KG/M2 | HEART RATE: 80 BPM | SYSTOLIC BLOOD PRESSURE: 108 MMHG | DIASTOLIC BLOOD PRESSURE: 82 MMHG | OXYGEN SATURATION: 99 % | WEIGHT: 128 LBS | TEMPERATURE: 97.3 F | HEIGHT: 63 IN

## 2025-06-10 DIAGNOSIS — E55.9 VITAMIN D DEFICIENCY: ICD-10-CM

## 2025-06-10 DIAGNOSIS — Z00.00 GENERAL MEDICAL EXAM: Primary | ICD-10-CM

## 2025-06-10 DIAGNOSIS — Z13.220 SCREENING FOR LIPID DISORDERS: ICD-10-CM

## 2025-06-10 DIAGNOSIS — Z52.4 DONOR OF KIDNEY FOR TRANSPLANT: ICD-10-CM

## 2025-06-10 DIAGNOSIS — M51.26 HERNIATED LUMBAR INTERVERTEBRAL DISC: ICD-10-CM

## 2025-06-10 DIAGNOSIS — E66.9 OBESITY WITH BODY MASS INDEX 30 OR GREATER: ICD-10-CM

## 2025-06-10 PROBLEM — R21 RASH AND OTHER NONSPECIFIC SKIN ERUPTION: Status: ACTIVE | Noted: 2023-07-27

## 2025-06-10 RX ORDER — TIRZEPATIDE 15 MG/.5ML
15 INJECTION, SOLUTION SUBCUTANEOUS
Qty: 2 ML | Refills: 0 | Status: SHIPPED | OUTPATIENT
Start: 2025-06-10

## 2025-06-10 RX ORDER — METHYLPREDNISOLONE 4 MG/1
TABLET ORAL
Qty: 21 TABLET | Refills: 0 | Status: SHIPPED | OUTPATIENT
Start: 2025-06-10 | End: 2025-06-16

## 2025-06-10 ASSESSMENT — VISUAL ACUITY: OU: 1

## 2025-06-10 ASSESSMENT — ANXIETY QUESTIONNAIRES
2. NOT BEING ABLE TO STOP OR CONTROL WORRYING: NOT AT ALL
IF YOU CHECKED OFF ANY PROBLEMS ON THIS QUESTIONNAIRE, HOW DIFFICULT HAVE THESE PROBLEMS MADE IT FOR YOU TO DO YOUR WORK, TAKE CARE OF THINGS AT HOME, OR GET ALONG WITH OTHER PEOPLE: NOT DIFFICULT AT ALL
1. FEELING NERVOUS, ANXIOUS, OR ON EDGE: NOT AT ALL
4. TROUBLE RELAXING: NOT AT ALL
7. FEELING AFRAID AS IF SOMETHING AWFUL MIGHT HAPPEN: NOT AT ALL
6. BECOMING EASILY ANNOYED OR IRRITABLE: SEVERAL DAYS
GAD7 TOTAL SCORE: 1
5. BEING SO RESTLESS THAT IT IS HARD TO SIT STILL: NOT AT ALL
3. WORRYING TOO MUCH ABOUT DIFFERENT THINGS: NOT AT ALL

## 2025-06-10 ASSESSMENT — PROMIS GLOBAL HEALTH SCALE
EMOTIONAL_PROBLEMS: SOMETIMES
RATE_SOCIAL_SATISFACTION: VERY GOOD
RATE_MENTAL_HEALTH: GOOD
RATE_AVERAGE_FATIGUE: MODERATE
RATE_PHYSICAL_HEALTH: VERY GOOD
RATE_QUALITY_OF_LIFE: VERY GOOD
CARRYOUT_PHYSICAL_ACTIVITIES: COMPLETELY
RATE_GENERAL_HEALTH: VERY GOOD
CARRYOUT_SOCIAL_ACTIVITIES: VERY GOOD
RATE_AVERAGE_PAIN: 4

## 2025-06-10 ASSESSMENT — COLUMBIA-SUICIDE SEVERITY RATING SCALE - C-SSRS
6. HAVE YOU EVER DONE ANYTHING, STARTED TO DO ANYTHING, OR PREPARED TO DO ANYTHING TO END YOUR LIFE?: NO
2. HAVE YOU ACTUALLY HAD ANY THOUGHTS OF KILLING YOURSELF?: NO
1. IN THE PAST MONTH, HAVE YOU WISHED YOU WERE DEAD OR WISHED YOU COULD GO TO SLEEP AND NOT WAKE UP?: NO

## 2025-06-10 NOTE — ASSESSMENT & PLAN NOTE
Was to obtain labs for transplant; however, labs .  Reordered at this visit.  Will follow up with results upon completion  Orders:    CBC and Auto Differential; Future    Comprehensive metabolic panel; Future    Lipid Panel; Future    Vitamin D 25-Hydroxy,Total (for eval of Vitamin D levels); Future    Uric Acid; Future    Cystatin C; Future    PTH, intact; Future    Albumin-Creatinine Ratio, Urine Random; Future    Urinalysis with Reflex Microscopic; Future

## 2025-06-10 NOTE — ASSESSMENT & PLAN NOTE
Acute  Begin medrol taper for inflammation  Explained intended effects, potential side effects, and schedule of dosages of the medication.  Discussed nonpharmacological interventions for pain relief including massage, heat, and stretching.  May use topical analgesics such as Salonpas patches, Blue emu, IcyHot, or capsaicin cream.  Follow-up in 2 to 3 weeks if symptoms persist.    Orders:    methylPREDNISolone (Medrol Dospak) 4 mg tablets; Take as directed on package.

## 2025-06-21 LAB
25(OH)D3+25(OH)D2 SERPL-MCNC: 58 NG/ML (ref 30–100)
ALBUMIN SERPL-MCNC: 4.2 G/DL (ref 3.6–5.1)
ALBUMIN/CREAT UR: NORMAL MG/G CREAT
ALP SERPL-CCNC: 49 U/L (ref 31–125)
ALT SERPL-CCNC: 15 U/L (ref 6–29)
ANION GAP SERPL CALCULATED.4IONS-SCNC: 9 MMOL/L (CALC) (ref 7–17)
APPEARANCE UR: CLEAR
AST SERPL-CCNC: 15 U/L (ref 10–30)
BACTERIA #/AREA URNS HPF: ABNORMAL /HPF
BASOPHILS # BLD AUTO: 18 CELLS/UL (ref 0–200)
BASOPHILS NFR BLD AUTO: 0.4 %
BILIRUB SERPL-MCNC: 0.6 MG/DL (ref 0.2–1.2)
BILIRUB UR QL STRIP: NEGATIVE
BUN SERPL-MCNC: 14 MG/DL (ref 7–25)
CALCIUM SERPL-MCNC: 8.9 MG/DL (ref 8.6–10.2)
CHLORIDE SERPL-SCNC: 104 MMOL/L (ref 98–110)
CHOLEST SERPL-MCNC: 137 MG/DL
CHOLEST/HDLC SERPL: 3.3 (CALC)
CO2 SERPL-SCNC: 23 MMOL/L (ref 20–32)
COLOR UR: YELLOW
CREAT SERPL-MCNC: 1.28 MG/DL (ref 0.5–0.97)
CREAT UR-MCNC: 57 MG/DL (ref 20–275)
CYSTATIN C SERPL-MCNC: 1.09 MG/L (ref 0.52–1.24)
EGFRCR SERPLBLD CKD-EPI 2021: 56 ML/MIN/1.73M2
EOSINOPHIL # BLD AUTO: 59 CELLS/UL (ref 15–500)
EOSINOPHIL NFR BLD AUTO: 1.3 %
ERYTHROCYTE [DISTWIDTH] IN BLOOD BY AUTOMATED COUNT: 11.9 % (ref 11–15)
GFR/BSA.PRED SERPLBLD CYS-BASED-ARV: 71 ML/MIN/1.73M2
GLUCOSE SERPL-MCNC: 68 MG/DL (ref 65–99)
GLUCOSE UR QL STRIP: NEGATIVE
HCT VFR BLD AUTO: 41.8 % (ref 35–45)
HDLC SERPL-MCNC: 42 MG/DL
HGB BLD-MCNC: 13.8 G/DL (ref 11.7–15.5)
HGB UR QL STRIP: NEGATIVE
HYALINE CASTS #/AREA URNS LPF: ABNORMAL /LPF
KETONES UR QL STRIP: NEGATIVE
LDLC SERPL CALC-MCNC: 83 MG/DL (CALC)
LEUKOCYTE ESTERASE UR QL STRIP: ABNORMAL
LYMPHOCYTES # BLD AUTO: 1796 CELLS/UL (ref 850–3900)
LYMPHOCYTES NFR BLD AUTO: 39.9 %
MCH RBC QN AUTO: 31.7 PG (ref 27–33)
MCHC RBC AUTO-ENTMCNC: 33 G/DL (ref 32–36)
MCV RBC AUTO: 96.1 FL (ref 80–100)
MICROALBUMIN UR-MCNC: <0.2 MG/DL
MONOCYTES # BLD AUTO: 342 CELLS/UL (ref 200–950)
MONOCYTES NFR BLD AUTO: 7.6 %
NEUTROPHILS # BLD AUTO: 2286 CELLS/UL (ref 1500–7800)
NEUTROPHILS NFR BLD AUTO: 50.8 %
NITRITE UR QL STRIP: NEGATIVE
NONHDLC SERPL-MCNC: 95 MG/DL (CALC)
PH UR STRIP: 6 [PH] (ref 5–8)
PLATELET # BLD AUTO: 250 THOUSAND/UL (ref 140–400)
PMV BLD REES-ECKER: 10.1 FL (ref 7.5–12.5)
POTASSIUM SERPL-SCNC: 4.3 MMOL/L (ref 3.5–5.3)
PROT SERPL-MCNC: 6.8 G/DL (ref 6.1–8.1)
PROT UR QL STRIP: NEGATIVE
PTH-INTACT SERPL-MCNC: 53 PG/ML (ref 16–77)
RBC # BLD AUTO: 4.35 MILLION/UL (ref 3.8–5.1)
RBC #/AREA URNS HPF: ABNORMAL /HPF
SERVICE CMNT-IMP: ABNORMAL
SODIUM SERPL-SCNC: 136 MMOL/L (ref 135–146)
SP GR UR STRIP: 1.01 (ref 1–1.03)
SQUAMOUS #/AREA URNS HPF: ABNORMAL /HPF
TRIGL SERPL-MCNC: 41 MG/DL
URATE SERPL-MCNC: 3.9 MG/DL (ref 2.5–7)
WBC # BLD AUTO: 4.5 THOUSAND/UL (ref 3.8–10.8)
WBC #/AREA URNS HPF: ABNORMAL /HPF

## 2025-06-30 ENCOUNTER — APPOINTMENT (OUTPATIENT)
Dept: PRIMARY CARE | Facility: CLINIC | Age: 35
End: 2025-06-30
Payer: COMMERCIAL